# Patient Record
Sex: FEMALE | Race: WHITE | NOT HISPANIC OR LATINO | Employment: FULL TIME | URBAN - METROPOLITAN AREA
[De-identification: names, ages, dates, MRNs, and addresses within clinical notes are randomized per-mention and may not be internally consistent; named-entity substitution may affect disease eponyms.]

---

## 2023-05-04 ENCOUNTER — APPOINTMENT (EMERGENCY)
Dept: RADIOLOGY | Facility: HOSPITAL | Age: 48
End: 2023-05-04

## 2023-05-04 ENCOUNTER — HOSPITAL ENCOUNTER (EMERGENCY)
Facility: HOSPITAL | Age: 48
Discharge: HOME | End: 2023-05-04
Attending: EMERGENCY MEDICINE | Admitting: EMERGENCY MEDICINE

## 2023-05-04 ENCOUNTER — HOSPITAL ENCOUNTER (OUTPATIENT)
Dept: VASCULAR ULTRASOUND | Facility: HOSPITAL | Age: 48
Discharge: HOME | End: 2023-05-04
Attending: EMERGENCY MEDICINE

## 2023-05-04 VITALS
WEIGHT: 255 LBS | SYSTOLIC BLOOD PRESSURE: 141 MMHG | OXYGEN SATURATION: 97 % | HEART RATE: 81 BPM | DIASTOLIC BLOOD PRESSURE: 75 MMHG | TEMPERATURE: 97.8 F | HEIGHT: 65 IN | BODY MASS INDEX: 42.49 KG/M2 | RESPIRATION RATE: 18 BRPM

## 2023-05-04 DIAGNOSIS — M25.561 RIGHT KNEE PAIN: Primary | ICD-10-CM

## 2023-05-04 DIAGNOSIS — M79.89 LEG SWELLING: ICD-10-CM

## 2023-05-04 LAB
APTT PPP: 23 SECONDS (ref 23–37)
BASOPHILS # BLD AUTO: 0.05 THOUSANDS/ÂΜL (ref 0–0.1)
BASOPHILS NFR BLD AUTO: 1 % (ref 0–1)
D DIMER PPP FEU-MCNC: 0.44 UG/ML FEU
EOSINOPHIL # BLD AUTO: 0.34 THOUSAND/ÂΜL (ref 0–0.61)
EOSINOPHIL NFR BLD AUTO: 4 % (ref 0–6)
ERYTHROCYTE [DISTWIDTH] IN BLOOD BY AUTOMATED COUNT: 15 % (ref 11.6–15.1)
EXT PREGNANCY TEST URINE: NEGATIVE
EXT. CONTROL: NORMAL
HCT VFR BLD AUTO: 37 % (ref 34.8–46.1)
HGB BLD-MCNC: 12.3 G/DL (ref 11.5–15.4)
IMM GRANULOCYTES # BLD AUTO: 0.07 THOUSAND/UL (ref 0–0.2)
IMM GRANULOCYTES NFR BLD AUTO: 1 % (ref 0–2)
LYMPHOCYTES # BLD AUTO: 3.12 THOUSANDS/ÂΜL (ref 0.6–4.47)
LYMPHOCYTES NFR BLD AUTO: 37 % (ref 14–44)
MCH RBC QN AUTO: 28.1 PG (ref 26.8–34.3)
MCHC RBC AUTO-ENTMCNC: 33.2 G/DL (ref 31.4–37.4)
MCV RBC AUTO: 85 FL (ref 82–98)
MONOCYTES # BLD AUTO: 0.49 THOUSAND/ÂΜL (ref 0.17–1.22)
MONOCYTES NFR BLD AUTO: 6 % (ref 4–12)
NEUTROPHILS # BLD AUTO: 4.31 THOUSANDS/ÂΜL (ref 1.85–7.62)
NEUTS SEG NFR BLD AUTO: 51 % (ref 43–75)
NRBC BLD AUTO-RTO: 0 /100 WBCS
PLATELET # BLD AUTO: 269 THOUSANDS/UL (ref 149–390)
PMV BLD AUTO: 12.2 FL (ref 8.9–12.7)
RBC # BLD AUTO: 4.38 MILLION/UL (ref 3.81–5.12)
WBC # BLD AUTO: 8.38 THOUSAND/UL (ref 4.31–10.16)

## 2023-05-04 NOTE — ED PROVIDER NOTES
History  Chief Complaint   Patient presents with   • Knee Pain     Pt c/o of right knee pain after flight home from a cruise  26-year-old female past medical history of hypertension and depression presents for evaluation of right leg swelling and pain to the posterior aspect of the right knee after getting off a flight coming back from a cruise yesterday flight was 2-1/2 3 hours she noticed knee pain and lower leg swelling after that  No history of DVT PE, no chest pain or shortness of breath  Currently not on any anticoagulants  None       Past Medical History:   Diagnosis Date   • Depression    • Hypertension        History reviewed  No pertinent surgical history  History reviewed  No pertinent family history  I have reviewed and agree with the history as documented  E-Cigarette/Vaping     E-Cigarette/Vaping Substances     Social History     Tobacco Use   • Smoking status: Never     Passive exposure: Never   • Smokeless tobacco: Never   Substance Use Topics   • Alcohol use: Yes   • Drug use: Never       Review of Systems   Cardiovascular: Positive for leg swelling  Musculoskeletal:        Right knee pain       Physical Exam  Physical Exam  Vitals and nursing note reviewed  Constitutional:       Appearance: She is well-developed  HENT:      Head: Normocephalic and atraumatic  Cardiovascular:      Rate and Rhythm: Normal rate and regular rhythm  Heart sounds: No murmur heard  No friction rub  No gallop  Pulmonary:      Effort: Pulmonary effort is normal       Breath sounds: No wheezing or rales  Chest:      Chest wall: No tenderness  Abdominal:      General: There is no distension  Palpations: Abdomen is soft  There is no mass  Tenderness: There is no guarding or rebound  Musculoskeletal:      Right lower leg: Edema present  Left lower leg: Edema present        Comments: Right greater than left lower extremity swelling, otherwise the knee does not appear grossly swollen, no overlying erythema, nontender to palpation, no ligamentous laxity ,distally extremity is neurovascularly intact   Skin:     General: Skin is warm and dry  Neurological:      Mental Status: She is alert and oriented to person, place, and time           Vital Signs  ED Triage Vitals [05/04/23 0018]   Temperature Pulse Respirations Blood Pressure SpO2   97 8 °F (36 6 °C) 88 18 (!) 189/105 99 %      Temp Source Heart Rate Source Patient Position - Orthostatic VS BP Location FiO2 (%)   Tympanic Monitor Lying Right arm --      Pain Score       --           Vitals:    05/04/23 0018 05/04/23 0045 05/04/23 0100   BP: (!) 189/105  141/75   Pulse: 88 85 81   Patient Position - Orthostatic VS: Lying Sitting Sitting         Visual Acuity      ED Medications  Medications - No data to display    Diagnostic Studies  Results Reviewed     Procedure Component Value Units Date/Time    D-dimer, quantitative [[id number]]  (Normal) Collected: 05/04/23 0043    Lab Status: Final result Specimen: Blood from Arm, Left Updated: 05/04/23 0107     D-Dimer, Quant 0 44 ug/ml FEU     APTT [[id number]]  (Normal) Collected: 05/04/23 0043    Lab Status: Final result Specimen: Blood from Arm, Left Updated: 05/04/23 0104     PTT 23 seconds     CBC and differential [[id number]] Collected: 05/04/23 0043    Lab Status: Final result Specimen: Blood from Arm, Left Updated: 05/04/23 0049     WBC 8 38 Thousand/uL      RBC 4 38 Million/uL      Hemoglobin 12 3 g/dL      Hematocrit 37 0 %      MCV 85 fL      MCH 28 1 pg      MCHC 33 2 g/dL      RDW 15 0 %      MPV 12 2 fL      Platelets 587 Thousands/uL      nRBC 0 /100 WBCs      Neutrophils Relative 51 %      Immat GRANS % 1 %      Lymphocytes Relative 37 %      Monocytes Relative 6 %      Eosinophils Relative 4 %      Basophils Relative 1 %      Neutrophils Absolute 4 31 Thousands/µL      Immature Grans Absolute 0 07 Thousand/uL      Lymphocytes Absolute 3 12 Thousands/µL      Monocytes Absolute 0 49 Thousand/µL      Eosinophils Absolute 0 34 Thousand/µL      Basophils Absolute 0 05 Thousands/µL     POCT pregnancy, urine [[id number]]  (Normal) Resulted: 05/04/23 0035    Lab Status: Final result Updated: 05/04/23 0036     EXT Preg Test, Ur Negative     Control Valid                 XR knee 4+ vw right injury   ED Interpretation by Maxx Sweeney DO (05/04 7900)   This study was ordered and independently reviewed by me    No acute findings noted         VAS lower limb venous duplex study, unilateral/limited    (Results Pending)              Procedures  Procedures         ED Course  ED Course as of 05/04/23 0128   Thu May 04, 2023   0128 D-dimer negative, x-ray negative for fracture ,will apply Ace wrap to affected knee,  And risk factor will order outpatient ultrasound testing to further confirm since of VTE  No indication for anticoagulation or further inpatient treatment or evaluation at this time                                             Medical Decision Making  52year-old female with right knee pain and leg swelling  Differential diagnosis DVT, arthralgia, less likely infection given no joint swelling no overlying erythema or warmth    We will obtain D-dimer treat accordingly    Amount and/or Complexity of Data Reviewed  Labs: ordered  Radiology: ordered  Disposition  Final diagnoses:   Right knee pain   Leg swelling     Time reflects when diagnosis was documented in both MDM as applicable and the Disposition within this note     Time User Action Codes Description Comment    5/4/2023 12:42 AM Paul Ramirez Add [M25 561] Right knee pain     5/4/2023 12:42 AM Paul Ramirez Add [M79 89] Leg swelling       ED Disposition     ED Disposition   Discharge    Condition   Stable    Date/Time   u May 4, 2023  1:26 AM    Comment   Radha Mejia discharge to home/self care                 Follow-up Information     Follow up With Specialties Details Why Contact Info Additional Information     [facility] Emergency Department Emergency Medicine  If symptoms worsen [address] [place],[address] [id number]  [address] [facility] Emergency Department, [address] [place], [facility], [facility] [address]    Imelda Foreman DO Family Medicine Schedule an appointment as soon as possible for a visit   [address] [place] [address]             Patient's Medications    No medications on file       Outpatient Discharge Orders   VAS lower limb venous duplex study, unilateral/limited   Standing Status: Future Standing Exp   Date: 05/04/27       PDMP Review     None          ED Provider  Electronically Signed by           Noel Donald DO  05/04/23 0128